# Patient Record
Sex: MALE | Race: WHITE | NOT HISPANIC OR LATINO | Employment: PART TIME
[De-identification: names, ages, dates, MRNs, and addresses within clinical notes are randomized per-mention and may not be internally consistent; named-entity substitution may affect disease eponyms.]

---
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
## 2024-01-23 ENCOUNTER — OFFICE VISIT (OUTPATIENT)
Dept: DERMATOLOGY | Facility: CLINIC | Age: 19
End: 2024-01-23
Attending: DERMATOLOGY
Payer: COMMERCIAL

## 2024-01-23 VITALS — BODY MASS INDEX: 25.62 KG/M2 | WEIGHT: 193.34 LBS | HEIGHT: 73 IN

## 2024-01-23 DIAGNOSIS — Z51.81 MEDICATION MONITORING ENCOUNTER: Primary | ICD-10-CM

## 2024-01-23 DIAGNOSIS — L40.0 PSORIASIS VULGARIS: ICD-10-CM

## 2024-01-23 DIAGNOSIS — L81.4 SOLAR LENTIGO: ICD-10-CM

## 2024-01-23 LAB
ALBUMIN SERPL BCG-MCNC: 4.6 G/DL (ref 3.5–5.2)
ALP SERPL-CCNC: 70 U/L (ref 65–260)
ALT SERPL W P-5'-P-CCNC: 9 U/L (ref 0–50)
ANION GAP SERPL CALCULATED.3IONS-SCNC: 10 MMOL/L (ref 7–15)
AST SERPL W P-5'-P-CCNC: 15 U/L (ref 0–35)
BILIRUB SERPL-MCNC: 0.2 MG/DL
BUN SERPL-MCNC: 11.9 MG/DL (ref 6–20)
CALCIUM SERPL-MCNC: 9.9 MG/DL (ref 8.6–10)
CHLORIDE SERPL-SCNC: 102 MMOL/L (ref 98–107)
CREAT SERPL-MCNC: 1.02 MG/DL (ref 0.67–1.17)
DEPRECATED HCO3 PLAS-SCNC: 28 MMOL/L (ref 22–29)
EGFRCR SERPLBLD CKD-EPI 2021: >90 ML/MIN/1.73M2
ERYTHROCYTE [DISTWIDTH] IN BLOOD BY AUTOMATED COUNT: 11.4 % (ref 10–15)
GLUCOSE SERPL-MCNC: 102 MG/DL (ref 70–99)
HCT VFR BLD AUTO: 43.6 % (ref 40–53)
HGB BLD-MCNC: 14.9 G/DL (ref 13.3–17.7)
MCH RBC QN AUTO: 29.3 PG (ref 26.5–33)
MCHC RBC AUTO-ENTMCNC: 34.2 G/DL (ref 31.5–36.5)
MCV RBC AUTO: 86 FL (ref 78–100)
PLATELET # BLD AUTO: 287 10E3/UL (ref 150–450)
POTASSIUM SERPL-SCNC: 3.9 MMOL/L (ref 3.4–5.3)
PROT SERPL-MCNC: 7.6 G/DL (ref 6.4–8.3)
RBC # BLD AUTO: 5.08 10E6/UL (ref 4.4–5.9)
SODIUM SERPL-SCNC: 140 MMOL/L (ref 135–145)
WBC # BLD AUTO: 8.5 10E3/UL (ref 4–11)

## 2024-01-23 PROCEDURE — 85027 COMPLETE CBC AUTOMATED: CPT | Performed by: DERMATOLOGY

## 2024-01-23 PROCEDURE — 86481 TB AG RESPONSE T-CELL SUSP: CPT | Performed by: DERMATOLOGY

## 2024-01-23 PROCEDURE — 36415 COLL VENOUS BLD VENIPUNCTURE: CPT | Performed by: DERMATOLOGY

## 2024-01-23 PROCEDURE — G0463 HOSPITAL OUTPT CLINIC VISIT: HCPCS | Performed by: DERMATOLOGY

## 2024-01-23 PROCEDURE — 80053 COMPREHEN METABOLIC PANEL: CPT | Performed by: DERMATOLOGY

## 2024-01-23 PROCEDURE — 99214 OFFICE O/P EST MOD 30 MIN: CPT | Performed by: DERMATOLOGY

## 2024-01-23 ASSESSMENT — PAIN SCALES - GENERAL: PAINLEVEL: NO PAIN (0)

## 2024-01-23 NOTE — PATIENT INSTRUCTIONS
"[facility]- Pediatric Dermatology  Dr. Farzaneh Tello, Dr. Kenya Pugh, Dr. Merle Le Dr., NADYA Morales Dr., & Dr. Francie Rousseau    Non Urgent  Nurse Triage Line; [phone]- Hoda and [facility] DIOP Care Coordinators    Caryn (/Complex ) [phone]    If you need a prescription refill, please contact your pharmacy. Refills are approved or denied by our Physicians during normal business hours, Monday through Fridays  Per office policy, refills will not be granted if you have not been seen within the past year (or sooner depending on your child's condition)      Scheduling Information:   Pediatric Appointment Scheduling and Call Center [phone]   Radiology Scheduling- [phone]   Sedation Unit Scheduling- [phone]  Main  Services: [phone]   Kosovan: [phone]   Djiboutian: [phone]   Hmong/Georgian/Faroese: [phone]    Preadmission Nursing Department Fax Number: [phone] (Fax all pre-operative paperwork to this number)      For urgent matters arising during evenings, weekends, or holidays that cannot wait for normal business hours please call [phone] and ask for the Dermatology Resident On-Call to be paged.     In office light wand: \"excimer laser\"-- this is an in-office treatment   Other shampoos to try: T-gel shampoo and Cln healthy scalp shampoo. Use each of these 2 x per week. Leave in the scalp for 5 minutes before rinsing.    Come see me in 1 year, or if things aren't going well and you want to talk about changing your injections, call and we will see you over the summer      "

## 2024-01-23 NOTE — LETTER
"1/23/2024      RE: Kamlesh Hernandez  [address] [facility] [address]     Dear Colleague,    Thank you for the opportunity to participate in the care of your patient, Kamlesh Hernandez, at the [facility] at [facility]. Please see a copy of my visit note below.    Pediatric Dermatology Reutrn Patient Visit    Dermatology Problem List:  1. 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       History of Present Illness:  Kamlesh Hernandez is a 18 year old male who presents for follow up of psoriasis.  Here with dad who is an independent historian he was last seen 10 months ago at which time he continued on Stelara for his psoriasis.  He reports that he is taking his injections as scheduled every 12 weeks.  No issues with the injections themselves.  He continues to have good clearance of the psoriasis on his torso and extremities.  His scalp is causing him some issue.  He has small psoriasis bumps on the nape of his neck that are bothersome to him.  He does admit to picking them occasionally but denies vigorous scrubbing or brushing of his scalp. He was trying to add tea tree oil to his shampoo which was mildly helpful, but was causing drying of his hair so he discontinued this.  Currently using aloe and argon oil shampoo product.  Has also tried clobetasol solution in the past without much change.      ROS: 12 point ROS negative    Physical Examination:  Ht 6' 1.31\" (186.2 cm)   Wt 87.7 kg (193 lb 5.5 oz)   BMI 25.30 kg/m    Gen: well appearing teenaged male in no distress  Skin: exam of face, scalp, trunk, chest, back, abdomen, left and right arms and legs, neck was performed.   Extremities and torso are clear of lesions.  There is some mild flaking throughout " scalp with a cluster of scaly red psoriatic papules on the occipital scalp.  -He has mild acneiform lesions scattered throughout the face and intermixed in the scalp  -Left earlobe has a very dark brown 4 x 2 mm macule with reticulated pigment-identical to exam and photo from visit 1 year ago        Past Medical History:   Psoriasis     Social history  sophomore in college at [employer]    Medications:  Current Outpatient Medications   Medication    clobetasol (TEMOVATE) 0.05 % external solution    Multiple Vitamin (MULTIVITAMIN ADULT PO)    rizatriptan (MAXALT) 5 MG tablet    STELARA 45 MG/0.5ML SOSY     No current facility-administered medications for this visit.        Allergies   Allergen Reactions    Pollen Extract        Impression and Recommendations (Patient and patient's mother Counseled on the Following):  1. Psoriasis vulgaris, chronic disease, much improved on Stelara  He is having some recalcitrant scalp disease.  He would prefer to trial other topical medications rather than switch his systemic medication.  Briefly discussed that we could always switch to Cosentyx.  For now, we will trial T-Gel shampoo (trade size provided) 2 times per week, lather and scalp for 5 minutes prior to rinsing.  Sample also given of CLN shampoo which will treat any acneiform component of his scalp disease.  2.  Medication monitoring  Check quant gold, CBC and CMP today.--> as of 1/23 CBC and CMP normal  3.  lentigo, left ear    Since his prior authorization is good for 1 year, agreed to see him once yearly until he graduates from college, then we will transition him to adult dermatology at that time.  Return sooner as needed.    Farzaneh Tello MD  , Pediatric Dermatology

## 2024-01-23 NOTE — PROGRESS NOTES
"Pediatric Dermatology [facility] Patient Visit    Dermatology Problem List:  1. 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       History of Present Illness:  Kamlesh Hernandez is a 18 year old male who presents for follow up of psoriasis.  Here with dad who is an independent historian he was last seen 10 months ago at which time he continued on Stelara for his psoriasis.  He reports that he is taking his injections as scheduled every 12 weeks.  No issues with the injections themselves.  He continues to have good clearance of the psoriasis on his torso and extremities.  His scalp is causing him some issue.  He has small psoriasis bumps on the nape of his neck that are bothersome to him.  He does admit to picking them occasionally but denies vigorous scrubbing or brushing of his scalp. He was trying to add tea tree oil to his shampoo which was mildly helpful, but was causing drying of his hair so he discontinued this.  Currently using aloe and argon oil shampoo product.  Has also tried clobetasol solution in the past without much change.      ROS: 12 point ROS negative    Physical Examination:  Ht 6' 1.31\" (186.2 cm)   Wt 87.7 kg (193 lb 5.5 oz)   BMI 25.30 kg/m    Gen: well appearing teenaged male in no distress  Skin: exam of face, scalp, trunk, chest, back, abdomen, left and right arms and legs, neck was performed.   Extremities and torso are clear of lesions.  There is some mild flaking throughout scalp with a cluster of scaly red psoriatic papules on the occipital scalp.  -He has mild acneiform lesions scattered throughout the face and intermixed in the scalp  -Left earlobe has a very dark brown 4 x 2 mm macule with reticulated pigment-identical to exam and photo from visit 1 year ago        Past Medical History:   Psoriasis     Social " history  sophomore in college at [employer]    Medications:  Current Outpatient Medications   Medication    clobetasol (TEMOVATE) 0.05 % external solution    Multiple Vitamin (MULTIVITAMIN ADULT PO)    rizatriptan (MAXALT) 5 MG tablet    STELARA 45 MG/0.5ML SOSY     No current facility-administered medications for this visit.        Allergies   Allergen Reactions    Pollen Extract        Impression and Recommendations (Patient and patient's mother Counseled on the Following):  1. Psoriasis vulgaris, chronic disease, much improved on Stelara  He is having some recalcitrant scalp disease.  He would prefer to trial other topical medications rather than switch his systemic medication.  Briefly discussed that we could always switch to Cosentyx.  For now, we will trial T-Gel shampoo (trade size provided) 2 times per week, lather and scalp for 5 minutes prior to rinsing.  Sample also given of CLN shampoo which will treat any acneiform component of his scalp disease.  2.  Medication monitoring  Check quant gold, CBC and CMP today.--> as of 1/23 CBC and CMP normal  3.  lentigo, left ear    Since his prior authorization is good for 1 year, agreed to see him once yearly until he graduates from college, then we will transition him to adult dermatology at that time.  Return sooner as needed.    Farzaneh Tello MD  , Pediatric Dermatology

## 2024-01-23 NOTE — NURSING NOTE
"[facility] [[id number]]  Chief Complaint   Patient presents with    RECHECK     Psoriasis.     Initial Ht 6' 1.31\" (186.2 cm)   Wt 193 lb 5.5 oz (87.7 kg)   BMI 25.30 kg/m   Estimated body mass index is 25.3 kg/m  as calculated from the following:    Height as of this encounter: 6' 1.31\" (186.2 cm).    Weight as of this encounter: 193 lb 5.5 oz (87.7 kg).  Medication Reconciliation: complete    Does the patient need any medication refills today? No    Does the patient/parent need MyChart or Proxy acces today? No    Does the patient want a flu shot today? No    Lisa Dominique CMA              "

## 2024-01-24 LAB
QUANTIFERON MITOGEN: 10 IU/ML
QUANTIFERON NIL TUBE: 0.02 IU/ML
QUANTIFERON TB1 TUBE: 0.02 IU/ML
QUANTIFERON TB2 TUBE: 0.02

## 2024-01-25 LAB
GAMMA INTERFERON BACKGROUND BLD IA-ACNC: 0.02 IU/ML
M TB IFN-G BLD-IMP: NEGATIVE
M TB IFN-G CD4+ BCKGRND COR BLD-ACNC: 9.98 IU/ML
MITOGEN IGNF BCKGRD COR BLD-ACNC: 0 IU/ML
MITOGEN IGNF BCKGRD COR BLD-ACNC: 0 IU/ML

## 2024-04-28 ENCOUNTER — HEALTH MAINTENANCE LETTER (OUTPATIENT)
Age: 19
End: 2024-04-28

## 2024-05-22 DIAGNOSIS — L40.0 PSORIASIS VULGARIS: ICD-10-CM

## 2024-05-23 RX ORDER — USTEKINUMAB 45 MG/.5ML
INJECTION, SOLUTION SUBCUTANEOUS
Qty: 0.5 ML | Refills: 0 | Status: SHIPPED | OUTPATIENT
Start: 2024-05-23 | End: 2024-08-14

## 2024-05-23 NOTE — TELEPHONE ENCOUNTER
Refill request received from patient's pharmacy for Stelara. Pt was last seen on 1/23/24 with Dr. Tello, NO follow up scheduled currently. Order pended to Dr. Tello for review.

## 2024-08-13 DIAGNOSIS — L40.0 PSORIASIS VULGARIS: ICD-10-CM

## 2024-08-14 RX ORDER — USTEKINUMAB 45 MG/.5ML
INJECTION, SOLUTION SUBCUTANEOUS
Qty: 0.5 ML | Refills: 0 | Status: SHIPPED | OUTPATIENT
Start: 2024-08-14

## 2024-08-14 NOTE — TELEPHONE ENCOUNTER
Refill requested for Stelara. Pt was last seen in 01/2024 by Dr. Tello. Due to his age, pt is schedule for follow up with Dr. Le in 11/2024. Routing to Dr. Le to approve or deny the request.    Aide Carter CMA      Impression and Recommendations (Patient and patient's mother Counseled on the Following):  1. Psoriasis vulgaris, chronic disease, much improved on Stelara  He is having some recalcitrant scalp disease.  He would prefer to trial other topical medications rather than switch his systemic medication.  Briefly discussed that we could always switch to Cosentyx.  For now, we will trial T-Gel shampoo (trade size provided) 2 times per week, lather and scalp for 5 minutes prior to rinsing.  Sample also given of CLN shampoo which will treat any acneiform component of his scalp disease.  2.  Medication monitoring  Check quant gold, CBC and CMP today.--> as of 1/23 CBC and CMP normal  3.  lentigo, left ear     Since his prior authorization is good for 1 year, agreed to see him once yearly until he graduates from college, then we will transition him to adult dermatology at that time.  Return sooner as needed.     Farzaneh Tello MD  , Pediatric Dermatology

## 2024-10-31 DIAGNOSIS — L40.0 PSORIASIS VULGARIS: ICD-10-CM

## 2024-10-31 NOTE — TELEPHONE ENCOUNTER
Incoming refill request for STELARA 45 MG/0.5ML SOSY. Last appointment was 1/23/24. Next appointment 11/18/24. Routed to: Dr. Tello.

## 2024-11-01 RX ORDER — USTEKINUMAB 45 MG/.5ML
INJECTION, SOLUTION SUBCUTANEOUS
Qty: 0.5 ML | Refills: 0 | Status: SHIPPED | OUTPATIENT
Start: 2024-11-01

## 2024-11-05 ENCOUNTER — TELEPHONE (OUTPATIENT)
Dept: DERMATOLOGY | Facility: CLINIC | Age: 19
End: 2024-11-05
Payer: COMMERCIAL

## 2024-11-05 ENCOUNTER — OFFICE VISIT (OUTPATIENT)
Dept: DERMATOLOGY | Facility: CLINIC | Age: 19
End: 2024-11-05
Attending: DERMATOLOGY
Payer: COMMERCIAL

## 2024-11-05 VITALS — WEIGHT: 201.28 LBS | BODY MASS INDEX: 26.68 KG/M2 | HEIGHT: 73 IN

## 2024-11-05 DIAGNOSIS — L40.0 PSORIASIS VULGARIS: ICD-10-CM

## 2024-11-05 DIAGNOSIS — L64.9 ANDROGENIC ALOPECIA: ICD-10-CM

## 2024-11-05 DIAGNOSIS — L74.519 FOCAL HYPERHIDROSIS: Primary | ICD-10-CM

## 2024-11-05 PROCEDURE — 99214 OFFICE O/P EST MOD 30 MIN: CPT | Performed by: DERMATOLOGY

## 2024-11-05 PROCEDURE — G0463 HOSPITAL OUTPT CLINIC VISIT: HCPCS | Performed by: DERMATOLOGY

## 2024-11-05 PROCEDURE — G2211 COMPLEX E/M VISIT ADD ON: HCPCS | Performed by: DERMATOLOGY

## 2024-11-05 RX ORDER — MINOXIDIL 2.5 MG/1
2.5 TABLET ORAL DAILY
Qty: 30 TABLET | Refills: 5 | Status: SHIPPED | OUTPATIENT
Start: 2024-11-05

## 2024-11-05 NOTE — TELEPHONE ENCOUNTER
[facility] Call Center    Phone Message    May a detailed message be left on voicemail: yes     Reason for Call: Other: Mom said Dr Tello put in a prescription but the pharmacy does not have it, [id number] - [place], [place], mom requesting update once completed via [employer]. Mom states needing it tonight because he goes back to college       Action Taken: Message routed to:  Other: P PEDS DERMATOLOGY [facility]    Travel Screening: Not Applicable     Date of Service:

## 2024-11-05 NOTE — PATIENT INSTRUCTIONS
[facility]  Pediatric Dermatology [facility]    MD Kenya Malloy MD Christina Boull, MD Deana Gruenhagen, PA-C Josie Thurmond, MD Francie Vasquez MD    Important Numbers:  RN Care Coordinators (Non-urgent calls): [phone]    Sharona Drummond & Gao, RN   Vascular Anomalies Clinic: [phone]    Aide LOGAN CMA Care Coordinator   Complex : [phone]    Jaymie HAWKINS    Scheduling Information:   Pediatric Appointment Scheduling and Call Center: [phone]   Radiology Scheduling: [phone]   Sedation Unit Scheduling: [phone]    Main  Services: [phone]    Syriac: [phone]    Botswanan: [phone]    Hmong/Marshallese/Citizen of the [place]: [phone]    Refills:  If you need a prescription refill, please contact your pharmacy.   Refills are approved or denied by our physicians during normal business hours (Monday- Fridays).  Per office policy, refills will not be granted if you have not been seen within the past year (or sooner depending on your child's condition and medications).  Fax number for refills: [phone]    Preadmission Nursing Department Fax Number: [phone]  (Please fax all pre-operative paperwork to this number).    For urgent matters arising during evenings, weekends, or holidays that cannot wait for normal business hours, please call [phone] and ask for the Dermatology Resident On-Call to be paged.    ------------------------------------------------------------------------------------------------------------

## 2024-11-05 NOTE — NURSING NOTE
"[facility] [[id number]]  Chief Complaint   Patient presents with    RECHECK     Follow-up       Initial Ht 6' 1.15\" (185.8 cm)   Wt 201 lb 4.5 oz (91.3 kg)   BMI 26.45 kg/m   Estimated body mass index is 26.45 kg/m  as calculated from the following:    Height as of this encounter: 6' 1.15\" (185.8 cm).    Weight as of this encounter: 201 lb 4.5 oz (91.3 kg).  Medication Reconciliation: complete    Does the patient need any medication refills today? No    Does the patient/parent need MyChart or Proxy acces today? No    Has the patient received a flu shot this season? No    Do they want one today? No    Vicenta Kamara MA                "

## 2024-11-05 NOTE — LETTER
"11/5/2024      RE: Kamlesh Hernandez  [address] [facility] [address]     Dear Colleague,    Thank you for the opportunity to participate in the care of your patient, Kamlesh Hernandez, at the [facility] at [facility]. Please see a copy of my visit note below.    Pediatric Dermatology Return Patient Visit    Dermatology Problem List:  1. 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  Here with mom who is an independent historian.   He was last seen about 1 year ago at which time he was having flaring in his scalp so I offered a switch in his biologic medication, he preferred to try some topical therapies.  Despite medicated shampoos and clobetasol solution, he is continues to have flaring in the scalp.  He is also noticing some hair loss throughout the scalp as well as receding of his frontal hairline.  New issue is very sweaty hands and feet, this has been a problem for quite some time but recently he started a job that requires him to shake hands and he finds himself being embarrassed for this.  Wondering about treatment options.  Kamlesh reports that his acne has seemed worse over the last 2 years since starting Stelara, wonders if the medication could be an issue.  Has tried many topical over-the-counter therapies without much improvement.      ROS: See HPI, otherwise unremarkable    Physical Examination:  Ht 6' 1.15\" (185.8 cm)   Wt 91.3 kg (201 lb 4.5 oz)   BMI 26.45 kg/m    Gen: well appearing teenaged male in no distress  Skin: Extremities and torso are clear of lesions.  There is some mild flaking throughout scalp with a cluster of scaly " red psoriatic papules on the occipital scalp- worse than last year  -He has mild acneiform lesions scattered throughout the face and intermixed in the scalp  -Left earlobe has a very dark brown 4 x 2 mm macule with reticulated pigment-identical to exam and photo from visit 1 year ago-- unchanged        Past Medical History:   Psoriasis     Social history  Kurt in college at [employer]    Medications:  Current Outpatient Medications   Medication Sig Dispense Refill     STELARA 45 MG/0.5ML injection INJECT THE CONTENTS OF 1 SYRINGE UNDER THE SKIN EVERY 12 WEEKS 0.5 mL 0     aluminum chloride (DRYSOL) 20 % external solution Apply to hands and feet once daily at bedtime until improvement is noted, then okay to decrease frequency to maintain effect.  Repeat as needed. 60 mL 5     clobetasol (TEMOVATE) 0.05 % external solution Wet hair.  Drip into scalp up to 3 x per week as needed (Patient not taking: Reported on 11/5/2024) 50 mL 3     minoxidil (LONITEN) 2.5 MG tablet Take 1 tablet (2.5 mg) by mouth daily. 30 tablet 5     Multiple Vitamin (MULTIVITAMIN ADULT PO)  (Patient not taking: Reported on 11/5/2024)       rizatriptan (MAXALT) 5 MG tablet TAKE 1 TABLET BY MOUTH AT ONSET OF THE HEADACHE AS DIRECTED. MAY REPEAT IN 2 HOURS. NO MORE THAN 2 IN A DAY. ONLY 3 IN A WEEK (Patient not taking: Reported on 11/5/2024)       No current facility-administered medications for this visit.        Allergies   Allergen Reactions     Pollen Extract        Impression and Recommendations (Patient and patient's mother Counseled on the Following):  1. Psoriasis vulgaris, chronic disease, not well controlled on Stelara.   The longitudinal plan of care for the diagnosis(es)/condition(s) as documented were addressed during this visit. Due to the added complexity in care, I will continue to support Kamlesh in the subsequent management and with ongoing continuity of care.      He is having recalcitrant scalp disease  which is resulting in hair loss.  Recommend switch medication to Cosentyx, which is known to control psoriasis that is not well-controlled with Stelara.  300 mg subcutaneous at weeks 0, 1, 2, 3, 4 then every 4 weeks    Plan to repeat yearly safety labs at next visit     2. Hyperhidrosis, focal, hands and feet.  New diagnosis, chronic condition  Numerous treatment options available, at this time recommend aluminum chloride 20% applied to the affected areas nightly until improvement is achieved, then can decrease frequency to maintain effect.  Repeat as needed  Future considerations include iontophoresis (he has a friend who had good success with this)    3.  Hair loss:   Sores hair loss is multifactorial, suspect that his psoriasis is contributing to hair loss, as well as androgenic alopecia given the receding hairline noted on exam.  Medication switch to Cosentyx should offer better control of his scalp disease, and to treat the androgenic portion I recommend minoxidil 2.5 mg once daily.  Discussed most common side effects which is unwanted hair growth.  Baseline photos taken today.    4.  lentigo, left ear- unchanged in size and appearance.  Low threshold to biopsy if change in color or size    5.  Acne, facial  Psorin thinks that his acne got worse after starting Stelara.  For now will see if biologic medications which helps his acne, if no improvement seen with switch to Cosentyx, will offer other treatments for acne    I would like to see him again in 3 to 6 months.  Once his above conditions have stabilized, we will transition him to adult dermatology.  (Plans to settle in [place] after college) Family members follow with Juhi Francorene so we will likely refer to her.      Farzaneh Tello MD  , Pediatric Dermatology    Please do not hesitate to contact me if you have any questions/concerns.     Sincerely,       Farzaneh Tello MD

## 2024-11-05 NOTE — PROGRESS NOTES
"Pediatric Dermatology Return Patient Visit    Dermatology Problem List:  1. 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  Here with mom who is an independent historian.   He was last seen about 1 year ago at which time he was having flaring in his scalp so I offered a switch in his biologic medication, he preferred to try some topical therapies.  Despite medicated shampoos and clobetasol solution, he is continues to have flaring in the scalp.  He is also noticing some hair loss throughout the scalp as well as receding of his frontal hairline.  New issue is very sweaty hands and feet, this has been a problem for quite some time but recently he started a job that requires him to shake hands and he finds himself being embarrassed for this.  Wondering about treatment options.  Kamlesh reports that his acne has seemed worse over the last 2 years since starting Stelara, wonders if the medication could be an issue.  Has tried many topical over-the-counter therapies without much improvement.      ROS: See HPI, otherwise unremarkable    Physical Examination:  Ht 6' 1.15\" (185.8 cm)   Wt 91.3 kg (201 lb 4.5 oz)   BMI 26.45 kg/m    Gen: well appearing teenaged male in no distress  Skin: Extremities and torso are clear of lesions.  There is some mild flaking throughout scalp with a cluster of scaly red psoriatic papules on the occipital scalp- worse than last year  -He has mild acneiform lesions scattered throughout the face and intermixed in the scalp  -Left earlobe has a very dark brown 4 x 2 mm macule with reticulated pigment-identical to exam and photo from visit 1 year ago-- unchanged        Past Medical History:   Psoriasis     Social " history  Kurt in college at [employer]    Medications:  Current Outpatient Medications   Medication Sig Dispense Refill    STELARA 45 MG/0.5ML injection INJECT THE CONTENTS OF 1 SYRINGE UNDER THE SKIN EVERY 12 WEEKS 0.5 mL 0    aluminum chloride (DRYSOL) 20 % external solution Apply to hands and feet once daily at bedtime until improvement is noted, then okay to decrease frequency to maintain effect.  Repeat as needed. 60 mL 5    clobetasol (TEMOVATE) 0.05 % external solution Wet hair.  Drip into scalp up to 3 x per week as needed (Patient not taking: Reported on 11/5/2024) 50 mL 3    minoxidil (LONITEN) 2.5 MG tablet Take 1 tablet (2.5 mg) by mouth daily. 30 tablet 5    Multiple Vitamin (MULTIVITAMIN ADULT PO)  (Patient not taking: Reported on 11/5/2024)      rizatriptan (MAXALT) 5 MG tablet TAKE 1 TABLET BY MOUTH AT ONSET OF THE HEADACHE AS DIRECTED. MAY REPEAT IN 2 HOURS. NO MORE THAN 2 IN A DAY. ONLY 3 IN A WEEK (Patient not taking: Reported on 11/5/2024)       No current facility-administered medications for this visit.        Allergies   Allergen Reactions    Pollen Extract        Impression and Recommendations (Patient and patient's mother Counseled on the Following):  1. Psoriasis vulgaris, chronic disease, not well controlled on Stelara.   The longitudinal plan of care for the diagnosis(es)/condition(s) as documented were addressed during this visit. Due to the added complexity in care, I will continue to support Kamlesh in the subsequent management and with ongoing continuity of care.      He is having recalcitrant scalp disease which is resulting in hair loss.  Recommend switch medication to Cosentyx, which is known to control psoriasis that is not well-controlled with Stelara.  300 mg subcutaneous at weeks 0, 1, 2, 3, 4 then every 4 weeks    Plan to repeat yearly safety labs at next visit     2. Hyperhidrosis, focal, hands and feet.  New diagnosis, chronic condition  Numerous  treatment options available, at this time recommend aluminum chloride 20% applied to the affected areas nightly until improvement is achieved, then can decrease frequency to maintain effect.  Repeat as needed  Future considerations include iontophoresis (he has a friend who had good success with this)    3.  Hair loss:   Sores hair loss is multifactorial, suspect that his psoriasis is contributing to hair loss, as well as androgenic alopecia given the receding hairline noted on exam.  Medication switch to Cosentyx should offer better control of his scalp disease, and to treat the androgenic portion I recommend minoxidil 2.5 mg once daily.  Discussed most common side effects which is unwanted hair growth.  Baseline photos taken today.    4.  lentigo, left ear- unchanged in size and appearance.  Low threshold to biopsy if change in color or size    5.  Acne, facial  Psorin thinks that his acne got worse after starting Stelara.  For now will see if biologic medications which helps his acne, if no improvement seen with switch to Cosentyx, will offer other treatments for acne    I would like to see him again in 3 to 6 months.  Once his above conditions have stabilized, we will transition him to adult dermatology.  (Plans to settle in [place] after college) Family members follow with Juhi Wren so we will likely refer to her.      Farzaneh Tello MD  , Pediatric Dermatology

## 2024-11-05 NOTE — TELEPHONE ENCOUNTER
RN relayed message to Dr. Tello who was still in clinic. MD signed orders. RN sent Chegg message to the patient to update them.    Lisa Dominique RN

## 2024-11-06 ENCOUNTER — TELEPHONE (OUTPATIENT)
Dept: DERMATOLOGY | Facility: CLINIC | Age: 19
End: 2024-11-06
Payer: COMMERCIAL

## 2024-11-06 DIAGNOSIS — L40.0 PSORIASIS VULGARIS: Primary | ICD-10-CM

## 2024-11-06 NOTE — TELEPHONE ENCOUNTER
PA Initiation    Medication: COSENTYX UNOREADY 300 MG/2ML SC SOAJ  Insurance Company: [employer] - Phone [phone] Fax [phone]  Pharmacy Filling the Rx: [facility] - [place], [place] - [facility] [address]  Filling Pharmacy Phone: [phone]  Filling Pharmacy Fax: [phone]  Start Date: 11/6/2024         Thank you,     Ney Rivera [facility]  Pharmacy Clinic [facility]  [email or web address]   Phone: [phone]  Fax: [phone]

## 2024-11-07 NOTE — TELEPHONE ENCOUNTER
PA denied. Please see below. Will provider be appealing?    Thank you,     Ney Rivera CP  Pharmacy Clinic [facility]  [email or web address]   Phone: [phone]  Fax: [phone]

## 2024-11-07 NOTE — TELEPHONE ENCOUNTER
PRIOR AUTHORIZATION DENIED    Medication: COSENTYX UNOREADY 300 MG/2ML SC SOAJ  Insurance Company: [employer] - Phone [phone] Fax [phone]  Denial Date: 11/6/2024  Denial Reason(s): Must have tried and failed two preferred drug. Preferred drugs are: Enbrel, Humira, Otezla    Appeal Information:     Patient Notified: No                Thank you,     Ney Rivera [facility]  Pharmacy Clinic [facility]  [email or web address]   Phone: [phone]  Fax: [phone]

## 2024-11-12 ENCOUNTER — MYC MEDICAL ADVICE (OUTPATIENT)
Dept: DERMATOLOGY | Facility: CLINIC | Age: 19
End: 2024-11-12
Payer: COMMERCIAL

## 2024-11-12 NOTE — TELEPHONE ENCOUNTER
RN did speak with patient and relayed the information from Dr. Tello. Patient requested to discuss the medications which writer provided the most common side effects of the medications. Patient would like to take some time to review and then will follow up with clinic. RN sent medication guides to patient to review and discuss with his family if he wishes.

## 2024-11-12 NOTE — TELEPHONE ENCOUNTER
RN received call back from patient and his father. Patient states that they just spoke with the insurance and they were told that a form just needs to be filled out, which was being sent to the office, and then it may be covered. RN explained that it looks like it is just a PA form that they sent but that we will do our best to fill out and submit to insurance. RN completed form, Dr. Tello reviewed for accuracy and form was faxed to [employer] at [phone].

## 2024-11-12 NOTE — TELEPHONE ENCOUNTER
"Wes from [place] Medicaid called to request the monthly qty of of the secukinumab injections. RN explained there is a loading dose with this medication and pt will receive 300 mg once a week for 5 weeks, then after the 5th week, it will be 300 mg every 28 days. Wes verbalized understanding, explained \"that was all I needed at this time\" No further questions. Awaiting determination.   "

## 2024-11-12 NOTE — TELEPHONE ENCOUNTER
Per Dr. Tello:   I carefully reviewed his chart and it looks like we somehow got louis with getting the Stelara approved right off the bat. Can you please reach out to family and let them know that the insurance is stating that we have to try one that is given more frequently: Enbrel (weekly) or Humira (every 2 weeks). I do think of the Humira as supressing the immune system just a little bit more than the Enbrel but they are extremely similar meds.     RN left a VM for patient on personally identified VM requesting a return phone call to clinic to discuss treatment that Dr. Tello had prescribed. RN provided direct phone number for callback.

## 2024-11-13 NOTE — TELEPHONE ENCOUNTER
The following denial with rational for Cosentyx received. Routed to Dr. Tello to review and advise on further plan of care. Copy sent to scanning as well.

## 2024-11-18 NOTE — TELEPHONE ENCOUNTER
Mychart message sent to patient inquiring how he would like to proceed (Humira or Enbrel).    (3) adequate

## 2024-11-19 NOTE — TELEPHONE ENCOUNTER
RN LVM for parent to either call clinic back or to review Re Pethart message. Callback phone number provided.

## 2024-11-19 NOTE — TELEPHONE ENCOUNTER
RN spoke with patient and explained that insurance is upholding their denial of Cosentyx. RN explained that he will need to try Enbrel or Humira. RN inquired if patient has had a chance to review the medication guides that were sent to him and would like to make a decision about which medication he would like to start. Patient inquired if Stelara was as immune suppressive as Humira. RN placed patient on hold, discussed with Dr. Tello and she stated that Humira is slightly more immune suppressive than Stelara but that we have used this successfully in our patient population without reported frequent or serious infections. RN relayed this information to patient. He inquired about speaking with Dr. Tello, which RN noted she was in the middle of a busy clinic but writer would be happy to obtain questions that patient has, discuss with Dr. Tello and then give him a call back; patient declined. RN also offered for patient to send in a Simple-Fill message with any questions that comes up after our call and then we Dr. Tello can address. Patient states he would like to proceed with Humira. RN reviewed the typically PA process. Patient in agreement and denies further questions.

## 2024-11-25 ENCOUNTER — TELEPHONE (OUTPATIENT)
Dept: DERMATOLOGY | Facility: CLINIC | Age: 19
End: 2024-11-25
Payer: COMMERCIAL

## 2024-11-25 NOTE — TELEPHONE ENCOUNTER
PA Initiation    Medication: HUMIRA-PS/UV/ADOL HS STARTER 40 MG/0.8ML SC AJKT  Insurance Company: [employer] - Phone [phone] Fax [phone]  Pharmacy Filling the Rx: [facility] - [place], [place] - [address]  Filling Pharmacy Phone: [phone]  Filling Pharmacy Fax: [phone]  Start Date: 11/25/2024         Thank you,     Ney Rivera [facility]  Pharmacy Clinic [facility]  [email or web address]   Phone: [phone]  Fax: [phone]

## 2024-11-26 NOTE — TELEPHONE ENCOUNTER
Prior Authorization Approval    Medication: HUMIRA-PS/UV/ADOL HS STARTER 40 MG/0.8ML SC AJKT  Authorization Effective Date: 8/28/2024  Authorization Expiration Date: 11/26/2025  Approved Dose/Quantity: 1 starter kit per 28 days then 2 pens per 28 days  Reference #: [id number]   Insurance Company: [employer] - Phone [phone] Fax [phone]  Expected CoPay: $ 0  CoPay Card Available:      Financial Assistance Needed: No  Which Pharmacy is filling the prescription: [facility] - [facility] [address]   Pharmacy Notified: Yes  Patient Notified: Yes **pharmacy will contact pt**         Thank you,     Ney Rivera CP  Pharmacy Clinic [email or web address]   Phone: [phone]  Fax: [phone]

## 2025-02-26 ENCOUNTER — PHARMACY VISIT (OUTPATIENT)
Dept: PHARMACY | Facility: CLINIC | Age: 20
End: 2025-02-26
Payer: COMMERCIAL

## 2025-02-26 NOTE — PROGRESS NOTES
Psoriasis Clinical Follow Up Assessment    Activity Medications    HUMIRA      Summary Notes  Spoke with Kamlesh for assessment. Current Regimen: Humira 40mg every other week     Adherence: Pt was due for a dose today, is out of pens at home. Set up delivery for Friday. He will give that day, then keep normal schedule (3/12).     Tolerability: Denies SE/ISR     PsO: Not going well. Continuing to spread and worsen. Is present all over his arms now and getting some spots on his legs. Originally was doing pretty good on Stelara, but wanted to change to try to see if they could improve PsO on his scalp. That was the only area that had been an ongoing issue and it was causing some hair loss. They wanted to try Cosentyx but ins made him try Humira first. Pt wants to give it the full 3 months just to know he did his due diligence of trying the Humira. Already has appt 3/31 and likely will be trying for Cosentyx approval again after that.     Follow up: After appt    Beverley Levin, KyreeD  Therapy Management Pharmacist  [facility]
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
## 2025-03-17 ENCOUNTER — OFFICE VISIT (OUTPATIENT)
Dept: DERMATOLOGY | Facility: CLINIC | Age: 20
End: 2025-03-17
Attending: DERMATOLOGY
Payer: COMMERCIAL

## 2025-03-17 VITALS
DIASTOLIC BLOOD PRESSURE: 72 MMHG | HEART RATE: 59 BPM | BODY MASS INDEX: 26.34 KG/M2 | HEIGHT: 74 IN | WEIGHT: 205.25 LBS | SYSTOLIC BLOOD PRESSURE: 122 MMHG

## 2025-03-17 DIAGNOSIS — Z51.81 MEDICATION MONITORING ENCOUNTER: Primary | ICD-10-CM

## 2025-03-17 DIAGNOSIS — L40.0 PSORIASIS VULGARIS: ICD-10-CM

## 2025-03-17 LAB
ALBUMIN SERPL BCG-MCNC: 5 G/DL (ref 3.5–5.2)
ALP SERPL-CCNC: 70 U/L (ref 40–150)
ALT SERPL W P-5'-P-CCNC: 13 U/L (ref 0–70)
ANION GAP SERPL CALCULATED.3IONS-SCNC: 10 MMOL/L (ref 7–15)
AST SERPL W P-5'-P-CCNC: 15 U/L (ref 0–45)
BILIRUB SERPL-MCNC: 0.4 MG/DL
BUN SERPL-MCNC: 13.4 MG/DL (ref 6–20)
CALCIUM SERPL-MCNC: 10 MG/DL (ref 8.8–10.4)
CHLORIDE SERPL-SCNC: 101 MMOL/L (ref 98–107)
CHOLEST SERPL-MCNC: 154 MG/DL
CREAT SERPL-MCNC: 1.07 MG/DL (ref 0.67–1.17)
EGFRCR SERPLBLD CKD-EPI 2021: >90 ML/MIN/1.73M2
ERYTHROCYTE [DISTWIDTH] IN BLOOD BY AUTOMATED COUNT: 12.2 % (ref 10–15)
FASTING STATUS PATIENT QL REPORTED: YES
FASTING STATUS PATIENT QL REPORTED: YES
GLUCOSE SERPL-MCNC: 90 MG/DL (ref 70–99)
HCO3 SERPL-SCNC: 28 MMOL/L (ref 22–29)
HCT VFR BLD AUTO: 46.7 % (ref 40–53)
HDLC SERPL-MCNC: 43 MG/DL
HGB BLD-MCNC: 16.1 G/DL (ref 13.3–17.7)
LDLC SERPL CALC-MCNC: 89 MG/DL
MCH RBC QN AUTO: 29.9 PG (ref 26.5–33)
MCHC RBC AUTO-ENTMCNC: 34.5 G/DL (ref 31.5–36.5)
MCV RBC AUTO: 87 FL (ref 78–100)
NONHDLC SERPL-MCNC: 111 MG/DL
PLATELET # BLD AUTO: 245 10E3/UL (ref 150–450)
POTASSIUM SERPL-SCNC: 4.7 MMOL/L (ref 3.4–5.3)
PROT SERPL-MCNC: 8.1 G/DL (ref 6.4–8.3)
RBC # BLD AUTO: 5.38 10E6/UL (ref 4.4–5.9)
SODIUM SERPL-SCNC: 139 MMOL/L (ref 135–145)
TRIGL SERPL-MCNC: 111 MG/DL
WBC # BLD AUTO: 8.7 10E3/UL (ref 4–11)

## 2025-03-17 PROCEDURE — G0463 HOSPITAL OUTPT CLINIC VISIT: HCPCS | Performed by: DERMATOLOGY

## 2025-03-17 PROCEDURE — 85048 AUTOMATED LEUKOCYTE COUNT: CPT | Performed by: DERMATOLOGY

## 2025-03-17 PROCEDURE — 80061 LIPID PANEL: CPT | Performed by: DERMATOLOGY

## 2025-03-17 PROCEDURE — 36415 COLL VENOUS BLD VENIPUNCTURE: CPT | Performed by: DERMATOLOGY

## 2025-03-17 PROCEDURE — 84075 ASSAY ALKALINE PHOSPHATASE: CPT | Performed by: DERMATOLOGY

## 2025-03-17 PROCEDURE — 86481 TB AG RESPONSE T-CELL SUSP: CPT | Performed by: DERMATOLOGY

## 2025-03-17 PROCEDURE — 82565 ASSAY OF CREATININE: CPT | Performed by: DERMATOLOGY

## 2025-03-17 PROCEDURE — 85018 HEMOGLOBIN: CPT | Performed by: DERMATOLOGY

## 2025-03-17 PROCEDURE — 82435 ASSAY OF BLOOD CHLORIDE: CPT | Performed by: DERMATOLOGY

## 2025-03-17 RX ORDER — KETOCONAZOLE 20 MG/ML
SHAMPOO, SUSPENSION TOPICAL
Qty: 120 ML | Refills: 11 | Status: SHIPPED | OUTPATIENT
Start: 2025-03-17

## 2025-03-17 RX ORDER — MINOXIDIL 2.5 MG/1
5 TABLET ORAL DAILY
Qty: 180 TABLET | Refills: 3 | Status: SHIPPED | OUTPATIENT
Start: 2025-03-17

## 2025-03-17 ASSESSMENT — PAIN SCALES - GENERAL: PAINLEVEL_OUTOF10: NO PAIN (0)

## 2025-03-17 NOTE — PROGRESS NOTES
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  Here with mom who is an independent historian.   He was last seen 4 months ago at which time his scalp psoriasis was not under good control, so I recommended to switch to Cosentyx.  Unfortunately, insurance did not approve this and recommended a trial of Humira.  He has now been taking Humira for over 3 months, unfortunately his scalp condition has worsened, he has now developed acne on his chest and back, and he has new areas of psoriasis involvement on his arms and feet.    His feet are also peeling which is a new issue    At last visit we started oral minoxidil 2.5 mg for androgenetic alopecia, he states that it seemed to help for the first few weeks and then the effects seem to taper off and stabilize.  He has been out of medicine for the last month or so and wondering about next steps      ROS: See HPI, otherwise unremarkable    Physical Examination:  /72   Pulse 59   Ht 6' 2\" (188 cm)   Wt 93.1 kg (205 lb 4 oz)   BMI 26.35 kg/m    Gen: well appearing teenaged male in no distress  Skin:   There is some mild flaking throughout scalp with a cluster of scaly red psoriatic papules on the occipital scalp-more prominent than previous  -He has mild acneiform lesions scattered throughout the face and throughout the back and chest  -There is thin pink psoriatic plaques on bilateral forearms and left dorsal foot        Past Medical History:   Psoriasis     Social history  Kurt in college at [employer] in [place]    Medications:  Current Outpatient Medications   Medication Sig Dispense Refill "    Adalimumab (HUMIRA *CF*) 40 MG/0.4ML pen kit Inject 0.4 mLs (40 mg) subcutaneously every 14 days. 0.8 mL 2    Adalimumab 80 MG/0.8ML & 40MG/0.4ML AJKT Inject 80 mg subcutaneously See Admin Instructions. Inject 0.8 mL (80 mg) subcutaneous for 1 dose, followed by 40 mg every other week beginning 1 week after initial dose (see separate order) 1 each 0    aluminum chloride (DRYSOL) 20 % external solution Apply to hands and feet once daily at bedtime until improvement is noted, then okay to decrease frequency to maintain effect.  Repeat as needed. 60 mL 5    ketoconazole (NIZORAL) 2 % external shampoo Lather in scalp 2 x per week for 5 minutes then rinse 120 mL 11    minoxidil (LONITEN) 2.5 MG tablet Take 2 tablets (5 mg) by mouth daily. 180 tablet 3    minoxidil (LONITEN) 2.5 MG tablet Take 1 tablet (2.5 mg) by mouth daily. 30 tablet 5    Secukinumab 300 MG/2ML SOAJ Inject 300 mg subcutaneously once a week. For 5 weeks 10 mL 0    Secukinumab 300 MG/2ML SOAJ Inject 300 mg subcutaneously every 28 days. 2 mL 2    STELARA 45 MG/0.5ML injection INJECT THE CONTENTS OF 1 SYRINGE UNDER THE SKIN EVERY 12 WEEKS 0.5 mL 0    clobetasol (TEMOVATE) 0.05 % external solution Wet hair.  Drip into scalp up to 3 x per week as needed (Patient not taking: Reported on 1/23/2024) 50 mL 3    Multiple Vitamin (MULTIVITAMIN ADULT PO)  (Patient not taking: Reported on 1/23/2024)      rizatriptan (MAXALT) 5 MG tablet TAKE 1 TABLET BY MOUTH AT ONSET OF THE HEADACHE AS DIRECTED. MAY REPEAT IN 2 HOURS. NO MORE THAN 2 IN A DAY. ONLY 3 IN A WEEK (Patient not taking: Reported on 1/23/2024)       No current facility-administered medications for this visit.        Allergies   Allergen Reactions    Pollen Extract        Impression and Recommendations (Patient and patient's mother Counseled on the Following):  1. Psoriasis vulgaris, chronic disease, worse on Humira     he has had an over 3-month trial of Humira with worsening of his psoriasis.  Recommend  stop this.   Recommend switch medication to Cosentyx, which is known to control psoriasis that is not well-controlled with other biologic medications  Loading dose is 300 mg subcutaneous at weeks 0, 1, 2, 3, 4 then every 4 weeks  Injection teaching was completed at the last appointment.    The longitudinal plan of care for the diagnosis(es)/condition(s) as documented were addressed during this visit. Due to the added complexity in care, I will continue to support Kamlesh in the subsequent management and with ongoing continuity of care.    2. Medication monitoring encounter  Due for safety labs today-- CBC, CMP normal, quant gold pending    3.  Hair loss,   Kamlesh's hair loss is multifactorial, suspect that his psoriasis is contributing to hair loss, as well as androgenic alopecia given the receding hairline noted on exam. continue minoxidil, will trial an increase to 5 mg once daily.  Discussed most common side effects which is unwanted hair growth.  Briefly mentioned propecia as next option but has higher risk/SE profile. Photos taken      I would like to see him again in 3 to 4 month     Once his above conditions have stabilized, we will transition him to adult dermatology.  (Plans to settle in [facility] after college) Family members follow with Juhi Wren so we will likely refer to her.      Farzaneh Tello MD  , Pediatric Dermatology

## 2025-03-17 NOTE — LETTER
"3/17/2025      RE: Kamlesh Hernandez  [id number] [address] [facility] [id number]     Dear Colleague,    Thank you for the opportunity to participate in the care of your patient, Kamlesh Hernandez, at the [facility] at [facility]. Please see a copy of my visit note below.    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  Here with mom who is an independent historian.   He was last seen 4 months ago at which time his scalp psoriasis was not under good control, so I recommended to switch to Cosentyx.  Unfortunately, insurance did not approve this and recommended a trial of Humira.  He has now been taking Humira for over 3 months, unfortunately his scalp condition has worsened, he has now developed acne on his chest and back, and he has new areas of psoriasis involvement on his arms and feet.    His feet are also peeling which is a new issue    At last visit we started oral minoxidil 2.5 mg for androgenetic alopecia, he states that it seemed to help for the first few weeks and then the effects seem to taper off and stabilize.  He has been out of medicine for the last month or so and wondering about next steps      ROS: See HPI, otherwise unremarkable    Physical Examination:  /72   Pulse 59   Ht 6' 2\" (188 cm)   Wt 93.1 kg (205 lb 4 oz)   BMI 26.35 kg/m    Gen: well appearing teenaged male in no distress  Skin:   There is some mild flaking throughout scalp with a cluster of scaly red psoriatic papules on the occipital scalp-more prominent than previous  -He has mild acneiform " lesions scattered throughout the face and throughout the back and chest  -There is thin pink psoriatic plaques on bilateral forearms and left dorsal foot        Past Medical History:   Psoriasis     Social history  Kurt in college at [employer] in [place]    Medications:  Current Outpatient Medications   Medication Sig Dispense Refill     Adalimumab (HUMIRA *CF*) 40 MG/0.4ML pen kit Inject 0.4 mLs (40 mg) subcutaneously every 14 days. 0.8 mL 2     Adalimumab 80 MG/0.8ML & 40MG/0.4ML AJKT Inject 80 mg subcutaneously See Admin Instructions. Inject 0.8 mL (80 mg) subcutaneous for 1 dose, followed by 40 mg every other week beginning 1 week after initial dose (see separate order) 1 each 0     aluminum chloride (DRYSOL) 20 % external solution Apply to hands and feet once daily at bedtime until improvement is noted, then okay to decrease frequency to maintain effect.  Repeat as needed. 60 mL 5     ketoconazole (NIZORAL) 2 % external shampoo Lather in scalp 2 x per week for 5 minutes then rinse 120 mL 11     minoxidil (LONITEN) 2.5 MG tablet Take 2 tablets (5 mg) by mouth daily. 180 tablet 3     minoxidil (LONITEN) 2.5 MG tablet Take 1 tablet (2.5 mg) by mouth daily. 30 tablet 5     Secukinumab 300 MG/2ML SOAJ Inject 300 mg subcutaneously once a week. For 5 weeks 10 mL 0     Secukinumab 300 MG/2ML SOAJ Inject 300 mg subcutaneously every 28 days. 2 mL 2     STELARA 45 MG/0.5ML injection INJECT THE CONTENTS OF 1 SYRINGE UNDER THE SKIN EVERY 12 WEEKS 0.5 mL 0     clobetasol (TEMOVATE) 0.05 % external solution Wet hair.  Drip into scalp up to 3 x per week as needed (Patient not taking: Reported on 1/23/2024) 50 mL 3     Multiple Vitamin (MULTIVITAMIN ADULT PO)  (Patient not taking: Reported on 1/23/2024)       rizatriptan (MAXALT) 5 MG tablet TAKE 1 TABLET BY MOUTH AT ONSET OF THE HEADACHE AS DIRECTED. MAY REPEAT IN 2 HOURS. NO MORE THAN 2 IN A DAY. ONLY 3 IN A WEEK (Patient not taking: Reported on 1/23/2024)        No current facility-administered medications for this visit.        Allergies   Allergen Reactions     Pollen Extract        Impression and Recommendations (Patient and patient's mother Counseled on the Following):  1. Psoriasis vulgaris, chronic disease, worse on Humira     he has had an over 3-month trial of Humira with worsening of his psoriasis.  Recommend stop this.   Recommend switch medication to Cosentyx, which is known to control psoriasis that is not well-controlled with other biologic medications  Loading dose is 300 mg subcutaneous at weeks 0, 1, 2, 3, 4 then every 4 weeks  Injection teaching was completed at the last appointment.    The longitudinal plan of care for the diagnosis(es)/condition(s) as documented were addressed during this visit. Due to the added complexity in care, I will continue to support Kamlesh in the subsequent management and with ongoing continuity of care.    2. Medication monitoring encounter  Due for safety labs today-- CBC, CMP normal, quant gold pending    3.  Hair loss,   Kamlesh's hair loss is multifactorial, suspect that his psoriasis is contributing to hair loss, as well as androgenic alopecia given the receding hairline noted on exam. continue minoxidil, will trial an increase to 5 mg once daily.  Discussed most common side effects which is unwanted hair growth.  Briefly mentioned propecia as next option but has higher risk/SE profile. Photos taken      I would like to see him again in 3 to 4 month     Once his above conditions have stabilized, we will transition him to adult dermatology.  (Plans to settle in [place] [facility] after college) Family members follow with Juhi Montillaumu so we will likely refer to her.      Farzaneh Tello MD  , Pediatric Dermatology      Please do not hesitate to contact me if you have any questions/concerns.     Sincerely,       Farzaneh Tello MD

## 2025-03-17 NOTE — PATIENT INSTRUCTIONS
[facility]  Pediatric Dermatology [facility]    MD Kenya Malloy MD Christina Boull, MD Deana Gruenhagen, PA-C Josie Thurmond, MD Francie Vasquez MD    Important Numbers:  RN Care Coordinators (Non-urgent calls): [phone]    Sharona Drummond & Gao, RN   Vascular Anomalies Clinic: [phone]    Aide OLGAN CMA Care Coordinator   Complex : [phone]    Jaymie HAWKINS    Scheduling Information:   Pediatric Appointment Scheduling and Call Center: [phone]   Radiology Scheduling: [phone]   Sedation Unit Scheduling: [phone]    Main  Services: [phone]    Nepali: [phone]    Armenian: [phone]    Hmong/British/Jordanian: [phone]    Refills:  If you need a prescription refill, please contact your pharmacy.   Refills are approved or denied by our physicians during normal business hours (Monday- Fridays).  Per office policy, refills will not be granted if you have not been seen within the past year (or sooner depending on your child's condition and medications).  Fax number for refills: [phone]    Preadmission Nursing Department Fax Number: [phone]  (Please fax all pre-operative paperwork to this number).    For urgent matters arising during evenings, weekends, or holidays that cannot wait for normal business hours, please call [phone] and ask for the Dermatology Resident On-Call to be paged.    ------------------------------------------------------------------------------------------------------------     Propecia: read about this and we can talk more later

## 2025-03-17 NOTE — NURSING NOTE
"[facility] [[id number]]  Chief Complaint   Patient presents with    RECHECK     Follow-up       Initial /72   Pulse 59   Ht 6' 2\" (188 cm)   Wt 205 lb 4 oz (93.1 kg)   BMI 26.35 kg/m   Estimated body mass index is 26.35 kg/m  as calculated from the following:    Height as of this encounter: 6' 2\" (188 cm).    Weight as of this encounter: 205 lb 4 oz (93.1 kg).  Medication Reconciliation: complete    Does the patient need any medication refills today? N/A    Does the patient/parent have MyChart set up? Yes   Proxy access needed? N/A    Is the patient 18 or turning 18 in the next 2 months? N/A   If yes, make sure they have a Consent To Communicate on file      Vicenta Kamara MA          "

## 2025-03-18 ENCOUNTER — TELEPHONE (OUTPATIENT)
Dept: DERMATOLOGY | Facility: CLINIC | Age: 20
End: 2025-03-18
Payer: COMMERCIAL

## 2025-03-18 LAB
GAMMA INTERFERON BACKGROUND BLD IA-ACNC: 0.05 IU/ML
M TB IFN-G BLD-IMP: NEGATIVE
M TB IFN-G CD4+ BCKGRND COR BLD-ACNC: 9.95 IU/ML
MITOGEN IGNF BCKGRD COR BLD-ACNC: 0 IU/ML
MITOGEN IGNF BCKGRD COR BLD-ACNC: 0 IU/ML
QUANTIFERON MITOGEN: 10 IU/ML
QUANTIFERON NIL TUBE: 0.05 IU/ML
QUANTIFERON TB1 TUBE: 0.05 IU/ML
QUANTIFERON TB2 TUBE: 0.05

## 2025-03-18 NOTE — TELEPHONE ENCOUNTER
PA Initiation    Medication: COSENTYX UNOREADY 300 MG/2ML SC SOAJ  Insurance Company: [employer] - Phone [phone] Fax [phone]  Pharmacy Filling the Rx: [facility] - [place], [place] - [facility] [address]  Filling Pharmacy Phone: [phone]  Filling Pharmacy Fax: [phone]  Start Date: 3/18/2025         Thank you,     Ney Rivera [facility]  Pharmacy Clinic [facility]  [email or web address]   Phone: [phone]  Fax: [phone]

## 2025-03-19 NOTE — TELEPHONE ENCOUNTER
PA denied for Secukinumab 300 MG/2ML SOAJ. Must have tried and failed two or more of the preferred drugs: Enbrel, Humira, Otezla, or Xeljanz.     Patient has only tried a 3-month trial of Humira.     Please advise on appeal or alternative.    Lisa Dominique RN

## 2025-03-19 NOTE — TELEPHONE ENCOUNTER
PRIOR AUTHORIZATION DENIED    Medication: COSENTYX UNOREADY 300 MG/2ML SC SOAJ  Insurance Company: [employer] - Phone [phone] Fax [phone]  Denial Date: 3/18/2025  Denial Reason(s):         Appeal Information:     Patient Notified: No       Thank you,     Ney Rivera [facility]  Pharmacy Clinic [facility]  [email or web address]   Phone: [phone]  Fax: [phone]

## 2025-03-21 NOTE — TELEPHONE ENCOUNTER
Please reach out to Kamlesh and let him know that insurance is throwing us for another loop- they are insisting on TWO tried and failed meds.  Fortunately they have added a medication to the list- Xeljanz- that would be really reasonable to try.  It's a pill, not an injection and shouldn't have any side effects but one of the big differences is that at least at first we will have to do blood work every 3 months.   Of the alternative meds they suggest, I think this one has the best chance to help.  And of course if we try this for 3 months without seeing improvement, will will ask for the Cosentyx again.  Please ask him his thoughts.    Thanks.    IP

## 2025-03-27 NOTE — TELEPHONE ENCOUNTER
Additional [email or web address] communications sent to pt regarding Dr. Tello's recommendations for further plan of care. Awaiting reply

## 2025-04-21 ENCOUNTER — MYC MEDICAL ADVICE (OUTPATIENT)
Dept: DERMATOLOGY | Facility: CLINIC | Age: 20
End: 2025-04-21
Payer: COMMERCIAL

## 2025-04-21 DIAGNOSIS — L40.0 PSORIASIS VULGARIS: Primary | ICD-10-CM

## 2025-04-22 NOTE — TELEPHONE ENCOUNTER
[employer] communications routed to Dr. Tello to advise. Per 3/21/2025 [employer] communications with pt from Dr. Tello:   Please reach out to Kamlesh and let him know that insurance is throwing us for another loop- they are insisting on TWO tried and failed meds.  Fortunately they have added a medication to the list- Xeljanz- that would be really reasonable to try.  It's a pill, not an injection and shouldn't have any side effects but one of the big differences is that at least at first we will have to do blood work every 3 months.   Of the alternative meds they suggest, I think this one has the best chance to help.  And of course if we try this for 3 months without seeing improvement, will will ask for the Cosentyx again.  Please ask him his thoughts.

## 2025-04-24 RX ORDER — FLUOCINONIDE 0.5 MG/G
OINTMENT TOPICAL 2 TIMES DAILY
Qty: 30 G | Refills: 1 | Status: SHIPPED | OUTPATIENT
Start: 2025-04-24

## 2025-06-02 ENCOUNTER — PHARMACY VISIT (OUTPATIENT)
Dept: PHARMACY | Facility: CLINIC | Age: 20
End: 2025-06-02
Payer: COMMERCIAL

## 2025-06-02 NOTE — PROGRESS NOTES
Psoriasis Clinical Follow Up Assessment    Activity Medications    XELJANZ 5MG TABS    Summary Notes  Spoke with Kamlesh for assessment. Current Regimen: Xeljanz 5mg BID     Adherence: He started right when we delivered on 4/28 but he still has a few days of doses left today. Discussed he has probably missed about a week of doses since starting. He says he was out of town a few days and forgot it, and also has missed a dose here and there. Sometimes hard to remember BID but he is working on it. Discussed that adherence is the best thing he can do for efficacy.     Tolerability: Denies SE     PsO: Not seeing any changes so far. He is feeling frustrated and wants this to work. His scalp is still quite bothersome. Discussed topicals but he doesnt feel like they are helping. Plan is to give this a bit more time and then try for Cosentyx (wanted to try that first but ins made them try Xeljanz). Pt has been in regular contact with his clinic.     Follow up: Pt declines ongoing pharmacist calls. Has follow up planned with clinic.     Beverley Levin, KyreeD  Therapy Management Pharmacist  [facility]

## 2025-06-10 ENCOUNTER — OFFICE VISIT (OUTPATIENT)
Dept: INTERNAL MEDICINE | Facility: CLINIC | Age: 20
End: 2025-06-10
Payer: COMMERCIAL

## 2025-06-10 VITALS
OXYGEN SATURATION: 97 % | DIASTOLIC BLOOD PRESSURE: 64 MMHG | HEIGHT: 73 IN | RESPIRATION RATE: 16 BRPM | BODY MASS INDEX: 27.17 KG/M2 | WEIGHT: 205 LBS | SYSTOLIC BLOOD PRESSURE: 114 MMHG | TEMPERATURE: 97.1 F | HEART RATE: 113 BPM

## 2025-06-10 DIAGNOSIS — Z00.00 ROUTINE GENERAL MEDICAL EXAMINATION AT A HEALTH CARE FACILITY: Primary | ICD-10-CM

## 2025-06-10 DIAGNOSIS — L40.9 PSORIASIS: ICD-10-CM

## 2025-06-10 DIAGNOSIS — L65.9 HAIR LOSS: ICD-10-CM

## 2025-06-10 PROCEDURE — 99385 PREV VISIT NEW AGE 18-39: CPT | Performed by: INTERNAL MEDICINE

## 2025-06-10 SDOH — HEALTH STABILITY: PHYSICAL HEALTH: ON AVERAGE, HOW MANY DAYS PER WEEK DO YOU ENGAGE IN MODERATE TO STRENUOUS EXERCISE (LIKE A BRISK WALK)?: 4 DAYS

## 2025-06-10 ASSESSMENT — SOCIAL DETERMINANTS OF HEALTH (SDOH): HOW OFTEN DO YOU GET TOGETHER WITH FRIENDS OR RELATIVES?: MORE THAN THREE TIMES A WEEK

## 2025-06-10 NOTE — PROGRESS NOTES
Dr Blanchard's note    Patient's instructions / PLAN:                                                        Plan:  I placed order for future labs ( Thyroid and iron studies) - optional         ASSESSMENT & PLAN:                                                      (Z00.00) Routine general medical examination at a health care facility  (primary encounter diagnosis)  Comment:   Plan: TSH with free T4 reflex, Ferritin, Iron & Iron         Binding Capacity            (L65.9) Hair loss  Comment: most likely sec psoriasis  Plan: TSH with free T4 reflex, Ferritin, Iron & Iron         Binding Capacity        F/unit(s) w dermato     (L40.9) Psoriasis  Comment:   Plan:          Chief Complaint:                                                      Annual exam  Follow up chronic medical problems      SUBJECTIVE:                                                    History of present illness     We reviewed the chronic medical problems as above.   I reviewed the recent tests results in Epic     Psoriasis - scalp, diff meds, not working  Hair line receeding   Follicular psoriasis       ROS:                                                      ROS: negative for fever, chills, cough, wheezes, chest pain, shortness of breath, vomiting, abdominal pain, leg swelling     PMHx: - reviewed  History reviewed. No pertinent past medical history.      PSHx: reviewed  History reviewed. No pertinent surgical history.     Soc Hx: No daily alcohol, no smoking  Social History     Socioeconomic History    Marital status: Single     Spouse name: Not on file    Number of children: Not on file    Years of education: Not on file    Highest education level: Not on file   Occupational History    Not on file   Tobacco Use    Smoking status: Never    Smokeless tobacco: Never   Vaping Use    Vaping status: Never Used   Substance and Sexual Activity    Alcohol use: No    Drug use: No    Sexual activity: Not on file   Other Topics Concern    Not on file    Social History Narrative    Not on file     Social Drivers of Health     Financial Resource Strain: Low Risk  (6/10/2025)    Financial Resource Strain     Within the past 12 months, have you or your family members you live with been unable to get utilities (heat, electricity) when it was really needed?: No   Food Insecurity: Low Risk  (6/10/2025)    Food Insecurity     Within the past 12 months, did you worry that your food would run out before you got money to buy more?: No     Within the past 12 months, did the food you bought just not last and you didn t have money to get more?: No   Transportation Needs: Low Risk  (6/10/2025)    Transportation Needs     Within the past 12 months, has lack of transportation kept you from medical appointments, getting your medicines, non-medical meetings or appointments, work, or from getting things that you need?: No   Physical Activity: Unknown (6/10/2025)    Exercise Vital Sign     Days of Exercise per Week: 4 days     Minutes of Exercise per Session: Not on file   Stress: Stress Concern Present (6/10/2025)    [employer] - Occupational Stress Questionnaire     Feeling of Stress : To some extent   Social Connections: Unknown (6/10/2025)    Social Connection and Isolation Panel [NHANES]     Frequency of Communication with Friends and Family: Not on file     Frequency of Social Gatherings with Friends and Family: More than three times a week     Attends Yazdanism Services: Not on file     Active Member of Clubs or Organizations: Not on file     Attends Club or Organization Meetings: Not on file     Marital Status: Not on file   Interpersonal Safety: Low Risk  (6/10/2025)    Interpersonal Safety     Do you feel physically and emotionally safe where you currently live?: Yes     Within the past 12 months, have you been hit, slapped, kicked or otherwise physically hurt by someone?: No     Within the past 12 months, have you been humiliated or  emotionally abused in other ways by your partner or ex-partner?: No   Housing Stability: Low Risk  (6/10/2025)    Housing Stability     Do you have housing? : Yes     Are you worried about losing your housing?: No        Fam Hx: reviewed  History reviewed. No pertinent family history.      Screening: reviewed    All: reviewed    Meds: reviewed  Current Outpatient Medications   Medication Sig Dispense Refill    aluminum chloride (DRYSOL) 20 % external solution Apply to hands and feet once daily at bedtime until improvement is noted, then okay to decrease frequency to maintain effect.  Repeat as needed. 60 mL 5    clobetasol (TEMOVATE) 0.05 % external solution Wet hair.  Drip into scalp up to 3 x per week as needed 50 mL 3    fluocinonide (LIDEX) 0.05 % external ointment Apply topically 2 times daily. To affected areas on feet as directed 30 g 1    ketoconazole (NIZORAL) 2 % external shampoo Lather in scalp 2 x per week for 5 minutes then rinse 120 mL 11    minoxidil (LONITEN) 2.5 MG tablet Take 2 tablets (5 mg) by mouth daily. 180 tablet 3    Multiple Vitamin (MULTIVITAMIN ADULT PO)       tofacitinib (XELJANZ) 5 MG tablet Take 1 tablet (5 mg) by mouth 2 times daily. 60 tablet 3    Adalimumab (HUMIRA *CF*) 40 MG/0.4ML pen kit Inject 0.4 mLs (40 mg) subcutaneously every 14 days. 0.8 mL 2    Adalimumab 80 MG/0.8ML & 40MG/0.4ML AJKT Inject 80 mg subcutaneously See Admin Instructions. Inject 0.8 mL (80 mg) subcutaneous for 1 dose, followed by 40 mg every other week beginning 1 week after initial dose (see separate order) 1 each 0    minoxidil (LONITEN) 2.5 MG tablet Take 1 tablet (2.5 mg) by mouth daily. 30 tablet 5    rizatriptan (MAXALT) 5 MG tablet TAKE 1 TABLET BY MOUTH AT ONSET OF THE HEADACHE AS DIRECTED. MAY REPEAT IN 2 HOURS. NO MORE THAN 2 IN A DAY. ONLY 3 IN A WEEK (Patient not taking: Reported on 1/23/2024)      Secukinumab 300 MG/2ML SOAJ Inject 300 mg subcutaneously once a week. For 5 weeks 10 mL 0     "Secukinumab 300 MG/2ML SOAJ Inject 300 mg subcutaneously every 28 days. 2 mL 2    Secukinumab 300 MG/2ML SOAJ Inject 300 mg subcutaneously once a week. For 5 weeks 10 mL 0    Secukinumab 300 MG/2ML SOAJ Inject 300 mg subcutaneously every 28 days. 2 mL 2    STELARA 45 MG/0.5ML injection INJECT THE CONTENTS OF 1 SYRINGE UNDER THE SKIN EVERY 12 WEEKS 0.5 mL 0           OBJECTIVE:                                                    Physical Exam :    Blood pressure 114/64, pulse 113, temperature 97.1  F (36.2  C), temperature source Tympanic, resp. rate 16, height 1.854 m (6' 1\"), weight 93 kg (205 lb), SpO2 97%.     NAD, appears comfortable  Skin psoriasis  Neck: supple, no JVD,  no thyroidmegaly  Lymph nodes non palpable in the cervical, supraclavicular axillaries,   Chest: clear to auscultation with good respiratory effort  Cardiac: S1S2, RRR, no mgr appreciated  Abdomen: soft, not tender, not distended, audible bowel sound, no hepatosplenomegaly, no palpable masses, no abdominal bruits  Extremities: no cyanosis, clubbing or edema.   Neuro: A, Ox3, no focal signs.      Appropriate preventive services were discussed with this patient, including applicable screening as appropriate for nutrition, physical activity     Appropriate preventive services were discussed with this patient, including applicable screening as appropriate for   -- vaccines  -- nutrition,   -- physical activity   -- fall prevention,  -- keeping a healthy weight  -- Tobacco-use cessation, -- not smoker     Linda Blanchard MD  Internal Medicine     ###############################    Preventive Care Visit  [facility]  Linda Cadena MD, Internal Medicine  Maxi 10, 2025          Dorothy Whitlock is a 20 year old, presenting for the following:  Physical        6/10/2025     2:18 PM   Additional Questions   Roomed by Jacquelyn ZARAGOZA          HPI           Advance Care Planning    Discussed advance care planning with " patient; however, patient declined at this time.        6/10/2025   General Health   How would you rate your overall physical health? Good   Feel stress (tense, anxious, or unable to sleep) To some extent   (!) STRESS CONCERN      6/10/2025   Nutrition   Three or more servings of calcium each day? Yes   Diet: Regular (no restrictions)   How many servings of fruit and vegetables per day? (!) 2-3   How many sweetened beverages each day? (!) 2         6/10/2025   Exercise   Days per week of moderate/strenous exercise 4 days         6/10/2025   Social Factors   Frequency of gathering with friends or relatives More than three times a week   Worry food won't last until get money to buy more No   Food not last or not have enough money for food? No   Do you have housing? (Housing is defined as stable permanent housing and does not include staying outside in a car, in a tent, in an abandoned building, in an overnight shelter, or couch-surfing.) Yes   Are you worried about losing your housing? No   Lack of transportation? No   Unable to get utilities (heat,electricity)? No         6/10/2025   Dental   Dentist two times every year? Yes         Today's PHQ-2 Score:       6/10/2025     2:04 PM   PHQ-2 ( 1999 [employer])   Q1: Little interest or pleasure in doing things 0   Q2: Feeling down, depressed or hopeless 1   PHQ-2 Score 1    Q1: Little interest or pleasure in doing things Not at all   Q2: Feeling down, depressed or hopeless Several days   PHQ-2 Score 1       Patient-reported           6/10/2025   Substance Use   Alcohol more than 3/day or more than 7/wk No   Do you use any other substances recreationally? No     Social History     Tobacco Use    Smoking status: Never    Smokeless tobacco: Never   Vaping Use    Vaping status: Never Used   Substance Use Topics    Alcohol use: No    Drug use: No             6/10/2025   One time HIV Screening   Previous HIV test? No         6/10/2025   STI Screening   New sexual partner(s) since  "last STI/HIV test? No         6/10/2025   Contraception/Family Planning   Questions about contraception or family planning No        Reviewed and updated as needed this visit by Provider                             Objective    Exam  /64   Pulse 113   Temp 97.1  F (36.2  C) (Tympanic)   Resp 16   Ht 1.854 m (6' 1\")   Wt 93 kg (205 lb)   SpO2 97%   BMI 27.05 kg/m     Estimated body mass index is 27.05 kg/m  as calculated from the following:    Height as of this encounter: 1.854 m (6' 1\").    Weight as of this encounter: 93 kg (205 lb).    Physical Exam          Signed Electronically by: Linda Cadena MD    "

## 2025-06-10 NOTE — PATIENT INSTRUCTIONS
Plan: I placed order for future labs ( Thyroid and iron studies) - optional

## 2025-06-10 NOTE — NURSING NOTE
"Chief Complaint   Patient presents with    Physical     initial /64   Pulse 113   Temp 97.1  F (36.2  C) (Tympanic)   Resp 16   Ht 1.854 m (6' 1\")   Wt 93 kg (205 lb)   SpO2 97%   BMI 27.05 kg/m   Estimated body mass index is 27.05 kg/m  as calculated from the following:    Height as of this encounter: 1.854 m (6' 1\").    Weight as of this encounter: 93 kg (205 lb)..  bp completed using cuff size regular  MARC DE LUNA LPN  "

## 2025-07-09 ENCOUNTER — MYC MEDICAL ADVICE (OUTPATIENT)
Dept: INTERNAL MEDICINE | Facility: CLINIC | Age: 20
End: 2025-07-09
Payer: COMMERCIAL

## 2025-07-24 ENCOUNTER — DOCUMENTATION ONLY (OUTPATIENT)
Dept: OTHER | Facility: CLINIC | Age: 20
End: 2025-07-24
Payer: COMMERCIAL

## 2025-08-01 ENCOUNTER — TELEPHONE (OUTPATIENT)
Dept: DERMATOLOGY | Facility: CLINIC | Age: 20
End: 2025-08-01
Payer: COMMERCIAL

## 2025-08-08 ENCOUNTER — OFFICE VISIT (OUTPATIENT)
Dept: DERMATOLOGY | Facility: CLINIC | Age: 20
End: 2025-08-08
Attending: STUDENT IN AN ORGANIZED HEALTH CARE EDUCATION/TRAINING PROGRAM
Payer: COMMERCIAL

## 2025-08-08 VITALS — HEIGHT: 73 IN | BODY MASS INDEX: 27.67 KG/M2 | WEIGHT: 208.78 LBS

## 2025-08-08 DIAGNOSIS — L65.9 HAIR LOSS: ICD-10-CM

## 2025-08-08 DIAGNOSIS — Z00.00 ROUTINE GENERAL MEDICAL EXAMINATION AT A HEALTH CARE FACILITY: ICD-10-CM

## 2025-08-08 DIAGNOSIS — L40.0 PSORIASIS VULGARIS: ICD-10-CM

## 2025-08-08 DIAGNOSIS — B35.3 TINEA PEDIS, UNSPECIFIED LATERALITY: Primary | ICD-10-CM

## 2025-08-08 DIAGNOSIS — L73.0 ACNE NUCHAE KELOIDALIS: ICD-10-CM

## 2025-08-08 LAB
ALBUMIN SERPL BCG-MCNC: 4.7 G/DL (ref 3.5–5.2)
ALP SERPL-CCNC: 63 U/L (ref 40–150)
ALT SERPL W P-5'-P-CCNC: 13 U/L (ref 0–70)
ANION GAP SERPL CALCULATED.3IONS-SCNC: 9 MMOL/L (ref 7–15)
AST SERPL W P-5'-P-CCNC: 14 U/L (ref 0–45)
BASOPHILS # BLD AUTO: 0.1 10E3/UL (ref 0–0.2)
BASOPHILS NFR BLD AUTO: 1 %
BILIRUB SERPL-MCNC: 0.5 MG/DL
BUN SERPL-MCNC: 18.3 MG/DL (ref 6–20)
CALCIUM SERPL-MCNC: 9.5 MG/DL (ref 8.8–10.4)
CHLORIDE SERPL-SCNC: 105 MMOL/L (ref 98–107)
CHOLEST SERPL-MCNC: 157 MG/DL
CREAT SERPL-MCNC: 1.14 MG/DL (ref 0.67–1.17)
EGFRCR SERPLBLD CKD-EPI 2021: >90 ML/MIN/1.73M2
EOSINOPHIL # BLD AUTO: 0.2 10E3/UL (ref 0–0.7)
EOSINOPHIL NFR BLD AUTO: 2 %
ERYTHROCYTE [DISTWIDTH] IN BLOOD BY AUTOMATED COUNT: 12.1 % (ref 10–15)
FASTING STATUS PATIENT QL REPORTED: YES
FASTING STATUS PATIENT QL REPORTED: YES
FERRITIN SERPL-MCNC: 166 NG/ML (ref 31–409)
GLUCOSE SERPL-MCNC: 100 MG/DL (ref 70–99)
HCO3 SERPL-SCNC: 28 MMOL/L (ref 22–29)
HCT VFR BLD AUTO: 45 % (ref 40–53)
HDLC SERPL-MCNC: 36 MG/DL
HGB BLD-MCNC: 15.6 G/DL (ref 13.3–17.7)
IMM GRANULOCYTES # BLD: 0 10E3/UL
IMM GRANULOCYTES NFR BLD: 0 %
IRON BINDING CAPACITY (ROCHE): 280 UG/DL (ref 240–430)
IRON SATN MFR SERPL: 60 % (ref 15–46)
IRON SERPL-MCNC: 169 UG/DL (ref 61–157)
LDLC SERPL CALC-MCNC: 89 MG/DL
LYMPHOCYTES # BLD AUTO: 4.6 10E3/UL (ref 0.8–5.3)
LYMPHOCYTES NFR BLD AUTO: 57 %
MCH RBC QN AUTO: 29.2 PG (ref 26.5–33)
MCHC RBC AUTO-ENTMCNC: 34.7 G/DL (ref 31.5–36.5)
MCV RBC AUTO: 84 FL (ref 78–100)
MONOCYTES # BLD AUTO: 0.6 10E3/UL (ref 0–1.3)
MONOCYTES NFR BLD AUTO: 8 %
NEUTROPHILS # BLD AUTO: 2.7 10E3/UL (ref 1.6–8.3)
NEUTROPHILS NFR BLD AUTO: 33 %
NONHDLC SERPL-MCNC: 121 MG/DL
NRBC # BLD AUTO: 0 10E3/UL
NRBC BLD AUTO-RTO: 0 /100
PLATELET # BLD AUTO: 226 10E3/UL (ref 150–450)
POTASSIUM SERPL-SCNC: 4 MMOL/L (ref 3.4–5.3)
PROT SERPL-MCNC: 7.9 G/DL (ref 6.4–8.3)
RBC # BLD AUTO: 5.34 10E6/UL (ref 4.4–5.9)
SODIUM SERPL-SCNC: 142 MMOL/L (ref 135–145)
TRIGL SERPL-MCNC: 161 MG/DL
TSH SERPL DL<=0.005 MIU/L-ACNC: 2.77 UIU/ML (ref 0.3–4.2)
WBC # BLD AUTO: 8.2 10E3/UL (ref 4–11)

## 2025-08-08 PROCEDURE — 83540 ASSAY OF IRON: CPT | Performed by: STUDENT IN AN ORGANIZED HEALTH CARE EDUCATION/TRAINING PROGRAM

## 2025-08-08 PROCEDURE — 82728 ASSAY OF FERRITIN: CPT | Performed by: STUDENT IN AN ORGANIZED HEALTH CARE EDUCATION/TRAINING PROGRAM

## 2025-08-08 PROCEDURE — 82247 BILIRUBIN TOTAL: CPT | Performed by: STUDENT IN AN ORGANIZED HEALTH CARE EDUCATION/TRAINING PROGRAM

## 2025-08-08 PROCEDURE — 84443 ASSAY THYROID STIM HORMONE: CPT | Performed by: STUDENT IN AN ORGANIZED HEALTH CARE EDUCATION/TRAINING PROGRAM

## 2025-08-08 PROCEDURE — 82465 ASSAY BLD/SERUM CHOLESTEROL: CPT | Performed by: STUDENT IN AN ORGANIZED HEALTH CARE EDUCATION/TRAINING PROGRAM

## 2025-08-08 PROCEDURE — 36415 COLL VENOUS BLD VENIPUNCTURE: CPT | Performed by: STUDENT IN AN ORGANIZED HEALTH CARE EDUCATION/TRAINING PROGRAM

## 2025-08-08 PROCEDURE — 85025 COMPLETE CBC W/AUTO DIFF WBC: CPT | Performed by: STUDENT IN AN ORGANIZED HEALTH CARE EDUCATION/TRAINING PROGRAM

## 2025-08-08 RX ORDER — PRENATAL VIT 91/IRON/FOLIC/DHA 28-975-200
COMBINATION PACKAGE (EA) ORAL 2 TIMES DAILY
Qty: 42 G | Refills: 1 | Status: SHIPPED | OUTPATIENT
Start: 2025-08-08

## 2025-08-08 RX ORDER — FLUOCINONIDE TOPICAL SOLUTION USP, 0.05% 0.5 MG/ML
SOLUTION TOPICAL
Qty: 120 ML | Refills: 2 | Status: SHIPPED | OUTPATIENT
Start: 2025-08-08

## 2025-08-08 RX ORDER — KETOCONAZOLE 20 MG/ML
SHAMPOO, SUSPENSION TOPICAL
Qty: 120 ML | Refills: 11 | Status: SHIPPED | OUTPATIENT
Start: 2025-08-08

## 2025-08-08 RX ORDER — CLINDAMYCIN PHOSPHATE 10 UG/ML
LOTION TOPICAL 2 TIMES DAILY
Qty: 120 ML | Refills: 1 | Status: SHIPPED | OUTPATIENT
Start: 2025-08-08

## 2025-08-08 ASSESSMENT — PAIN SCALES - GENERAL: PAINLEVEL_OUTOF10: NO PAIN (0)

## 2025-08-16 ENCOUNTER — TELEPHONE (OUTPATIENT)
Dept: DERMATOLOGY | Facility: CLINIC | Age: 20
End: 2025-08-16
Payer: COMMERCIAL

## 2025-09-04 ENCOUNTER — PHARMACY VISIT (OUTPATIENT)
Dept: PHARMACY | Facility: CLINIC | Age: 20
End: 2025-09-04
Payer: COMMERCIAL

## 2025-11-26 ENCOUNTER — TELEPHONE (OUTPATIENT)
Dept: DERMATOLOGY | Facility: CLINIC | Age: 20
End: 2025-11-26
Payer: COMMERCIAL